# Patient Record
Sex: MALE | Race: WHITE | Employment: FULL TIME | URBAN - METROPOLITAN AREA
[De-identification: names, ages, dates, MRNs, and addresses within clinical notes are randomized per-mention and may not be internally consistent; named-entity substitution may affect disease eponyms.]

---

## 2024-03-03 ENCOUNTER — HOSPITAL ENCOUNTER (OUTPATIENT)
Age: 64
Discharge: HOME | End: 2024-03-03
Payer: OTHER MISCELLANEOUS

## 2024-03-03 ENCOUNTER — APPOINTMENT (OUTPATIENT)
Dept: GENERAL RADIOLOGY | Age: 64
End: 2024-03-03
Attending: NURSE PRACTITIONER
Payer: OTHER MISCELLANEOUS

## 2024-03-03 VITALS
BODY MASS INDEX: 47.74 KG/M2 | RESPIRATION RATE: 18 BRPM | WEIGHT: 315 LBS | HEART RATE: 81 BPM | OXYGEN SATURATION: 100 % | SYSTOLIC BLOOD PRESSURE: 150 MMHG | DIASTOLIC BLOOD PRESSURE: 80 MMHG | HEIGHT: 68 IN | TEMPERATURE: 98 F

## 2024-03-03 DIAGNOSIS — S42.91XA CLOSED FRACTURE OF RIGHT SHOULDER, INITIAL ENCOUNTER: ICD-10-CM
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
## 2024-03-03 PROCEDURE — 73030 X-RAY EXAM OF SHOULDER: CPT | Performed by: NURSE PRACTITIONER

## 2024-03-03 NOTE — ED INITIAL ASSESSMENT (HPI)
Pt sts fell at work yesterday morning- tripped over concrete parking block and landed on right shoulder. Pt is right handed. Denies other injuries.

## 2024-03-03 NOTE — ED PROVIDER NOTES
Patient Seen in: [facility]      History     Chief Complaint   Patient presents with    Arm or Hand Injury     Stated Complaint: right shoulder arm pain/workers comp    Subjective:   HPI    63yr old male here today with right shoulder pain after a fall at work yesterday.  He reports that he fell over a parking stop in the parking lot at work.  When he fell he landed on the right shoulder.  He was able to get up and was able to complete his day at work, with pain.  Today he arrives with pain 6/10 with any movement of the shoulder.  He denies hitting his head.  Loc.  Denies Dizziness or change of vision before or after the fall.     Objective:   Past Medical History:   Diagnosis Date    Calculus of kidney     Essential hypertension     Family history of diabetes mellitus     GERD     High blood pressure     HYPERLIPIDEMIA     HYPERTENSION     HYPOTHYROIDISM     Lipoprotein deficiencies     OBESITY     Obstructive sleep apnea (adult) (pediatric) SPLIT NIGHT 6-27-16    AHI 87 RDI 87 SaO2 g[place] 61% CPAP 8-20    Sleep apnea               History reviewed. No pertinent surgical history.             No pertinent social history.            Review of Systems    Positive for stated complaint: right shoulder arm pain/workers comp  Other systems are as noted in HPI.  Constitutional and vital signs reviewed.      All other systems reviewed and negative except as noted above.    Physical Exam     ED Triage Vitals [03/03/24 1255]   BP (!) 180/85   Pulse 81   Resp 18   Temp 97.6 °F (36.4 °C)   Temp src Temporal   SpO2 100 %   O2 Device None (Room air)       Current:/80   Pulse 81   Temp 97.6 °F (36.4 °C) (Temporal)   Resp 18   Ht 172.7 cm (5' 8\")   Wt (!) 144.2 kg   SpO2 100%   BMI 48.35 kg/m²         Physical Exam    Adult physical exam:     VS: Vital signs reviewed. O2 saturation within normal limits for this patient     General: Patient is awake and alert, oriented to person, place and time. Not in acute  distress.      HEENT: Head is normocephalic atraumatic. Pupils reactive bilaterally.  EOMs intact.  No facial droop or slurred speech.  No oral pallor. Mucous membranes moist.      Heart: S1-S2.  Regular rate and rhythm.       Lungs no respiratory distress noted     Abdomen: Soft, nontender, nondistended.  Active bowel sounds present.       Back: No CVA tenderness.     Upper extremity:     Right  Upper Extremity: No obvious deformity.  There is not significant swelling. There is tenderness to humoral head,  . ROM intact to the , elbow, wrist and digits, pt has pain 8/10 with ROM in the shoulder Distal capillary refill takes less than 2 seconds. Radial pulses are 2+ bilaterally.  Distal sensation and motor intact.    Skin: Normal skin turgor     CNS: Moves all 4 extremities.  Interacts appropriately.  No tremor.  No gait abnormality         ED Course   Labs Reviewed - No data to display       I have personally  reviewed available prior medical records for any recent pertinent discharge summaries/testing. Patient/family updated on results and plan, a verbalized understanding and agreement with the plan.  I explained to the patient that emergent conditions may arise and to go to the ER for new, worsening or any persistent conditions. I've explained the importance of taking all medicatons as prescribed, follow up, and return precuations,  All questions answered.    Please note that this report has been produced using speech recognition software and may contain errors related to that system including, but not limited to, errors in grammar, punctuation, and spelling, as well as words and phrases that possibly may have been recognized inappropriately.  If there are any questions or concerns, contact the dictating provider for clarification.         MDM      Patient presents with extremity pain s/p injury. History and physical exam as above.    Differential diagnois includes: Sprain, strain, contusion, fracture,  dislocation    X-ray reviewed by this APN reveals acute fracture. No evidence on x-ray of pathologic cause for fracture. This is not an open fracture. Patient did not lose consciousness or have any other injuries reported. No indication for reduction prior to splint placement. Patient placed in splint and counseled on splint care. Neurovascularly intact before and after sling application.  Counseled on rice and over-the-counter analgesics.  Recommend follow-up with PCP, referral given for orthopedist follow-up.  Return to ED precautions discussed with the patient who verbalized understanding                                   Medical Decision Making      Disposition and Plan     Clinical Impression:  1. Fall, initial encounter    2. Closed fracture of right shoulder, initial encounter         Disposition:  Discharge  3/3/2024  2:19 pm    Follow-up:  Dio Valadez MD  [address] [facility]  [address]  [facility] [address]  [phone]          [facility]  [address] [place] [id number]  [phone]              Medications Prescribed:  Discharge Medication List as of 3/3/2024  2:24 PM

## 2024-03-04 ENCOUNTER — TELEPHONE (OUTPATIENT)
Dept: ORTHOPEDICS CLINIC | Facility: CLINIC | Age: 64
End: 2024-03-04

## 2024-03-04 DIAGNOSIS — M25.511 RIGHT SHOULDER PAIN, UNSPECIFIED CHRONICITY: Primary | ICD-10-CM

## 2024-03-04 NOTE — TELEPHONE ENCOUNTER
3/11 is probably fine but those ER slings are not high quality and happy to add him in sooner to any catch up slot to be seen and given new DME

## 2024-03-04 NOTE — TELEPHONE ENCOUNTER
Can you see for this injury? Is next available around 3/11/24 appropriate?     Work injury  DOI:3/2/24  CONCLUSION:  Nondisplaced fracture along greater tuberosity and neck of right humerus with associated soft tissue swelling and probable shoulder joint effusion.

## 2024-03-05 NOTE — TELEPHONE ENCOUNTER
Please offer sooner appt than 3/11/24 per Dr Gayle's recommendation - can use catch up spot.   Will need to repeat xray day of appt. Ordered.    Thank you!

## 2024-03-06 NOTE — TELEPHONE ENCOUNTER
Sangeetha Blackwell1 hour ago (9:01 AM)     MEÑO  Hi,     Spoke to patient and states that it ended being a worker's comp so employer referred to another Sangeetha Agrawal

## (undated) NOTE — LETTER
Date & Time: 3/3/2024, 2:19 PM  Patient: Evert Leiva  Encounter Provider(s):    Janki Parra APRN       To Whom It May Concern:    Evert Leiva was seen and treated in our department on 3/3/2024. He should not return to work until cleared by orthopedics .    If you have any questions or concerns, please do not hesitate to call.        _____________________________  Physician/APC Signature